# Patient Record
Sex: MALE | URBAN - METROPOLITAN AREA
[De-identification: names, ages, dates, MRNs, and addresses within clinical notes are randomized per-mention and may not be internally consistent; named-entity substitution may affect disease eponyms.]

---

## 2018-08-01 ENCOUNTER — VIRTUAL VISIT (OUTPATIENT)
Dept: FAMILY MEDICINE | Facility: OTHER | Age: 43
End: 2018-08-01

## 2018-08-02 NOTE — PROGRESS NOTES
"Date:   Clinician: Suma Mccracken  Clinician NPI: [id number]  Patient: Ashvin Lujan  Patient : 1975  Patient Address: [address] [place] [address]  Patient Phone: [phone]  Visit Protocol: Ear pain  Patient Summary:  Ashvin is a 43 year old ( : 1975 ) male who initiated a Visit for swimmer's ear (ear pain). When asked the question \"Please sign me up to receive news, health information and promotions. \", Ashvin responded \"No\".    Ashvin reports that his ear pain started yesterday. The ear pain is located inside the right ear.   In addition to the ear pain, Ashvin is experiencing a feeling of fullness in the ear(s). He feels feverish.   Symptom Details     Pain: Ashvin is experiencing mild pain. It does not get worse when he gently pulls on the earlobe(s) and eats or chews.     Temperature: His current temperature is 100.3 degrees Fahrenheit. Ashvin is not sure how long he has had a temperature over 100 degrees Fahrenheit.      Ashvin denies itchiness, redness, and tenderness in the ear(s). He also denies having fluid draining from the ear(s), recent injuries near the ear(s), ever having ear tubes, and the possibility of a foreign object in the ear(s). Ashvin denies flying and swimming within the past week.    Weight: 205 lbs   He does not smoke or use smokeless tobacco.   Additional health information pertinent to this Visit as reported by the patient (free text): It may just be an ear infection. I am getting over a cold and it started to hurt from the start and has gotten increasingly worse everyday. I have had many in the past and the symptoms feel similar.    MEDICATIONS: No current medications, ALLERGIES: Penicillins, Sulfa (Sulfonamide Antibiotics)  Clinician Response:  Dear Ashvin,   I am sorry you are not feeling well. To determine the most appropriate care for you, I would like you to be seen in person to further discuss your health history and symptoms.  You will not be charged for " this Visit. Thank you for trusting us with your care.   Diagnosis: Refer for additional evaluation  Diagnosis ICD: R69  Additional Clinician Notes: This platform only allows for treatment of external ear infections, and your symptoms and photo do not support that diagnosis.  A proper ear exam is required to diagnose and treat appropriately.